# Patient Record
Sex: FEMALE | Race: WHITE | Employment: OTHER | URBAN - METROPOLITAN AREA
[De-identification: names, ages, dates, MRNs, and addresses within clinical notes are randomized per-mention and may not be internally consistent; named-entity substitution may affect disease eponyms.]

---

## 2024-09-19 LAB — AMB EXT HGBA1C: 7.8 %

## 2024-09-24 ENCOUNTER — LAB ENCOUNTER (OUTPATIENT)
Dept: LAB | Age: 69
End: 2024-09-24
Attending: FAMILY MEDICINE
Payer: MEDICARE

## 2024-09-24 ENCOUNTER — OFFICE VISIT (OUTPATIENT)
Facility: CLINIC | Age: 69
End: 2024-09-24
Payer: MEDICARE

## 2024-09-24 VITALS
DIASTOLIC BLOOD PRESSURE: 74 MMHG | BODY MASS INDEX: 40.82 KG/M2 | SYSTOLIC BLOOD PRESSURE: 120 MMHG | HEART RATE: 80 BPM | OXYGEN SATURATION: 97 % | HEIGHT: 66 IN | WEIGHT: 254 LBS

## 2024-09-24 DIAGNOSIS — E10.65 TYPE 1 DIABETES MELLITUS WITH HYPERGLYCEMIA (HCC): Primary | ICD-10-CM

## 2024-09-24 DIAGNOSIS — E10.65 TYPE 1 DIABETES MELLITUS WITH HYPERGLYCEMIA (HCC): ICD-10-CM

## 2024-09-24 DIAGNOSIS — E78.2 MIXED HYPERLIPIDEMIA: ICD-10-CM

## 2024-09-24 DIAGNOSIS — R63.5 WEIGHT GAIN: ICD-10-CM

## 2024-09-24 DIAGNOSIS — H43.13 VITREOUS HEMORRHAGE OF BOTH EYES (HCC): ICD-10-CM

## 2024-09-24 DIAGNOSIS — E03.9 HYPOTHYROIDISM, UNSPECIFIED TYPE: ICD-10-CM

## 2024-09-24 DIAGNOSIS — Z12.31 VISIT FOR SCREENING MAMMOGRAM: ICD-10-CM

## 2024-09-24 DIAGNOSIS — F32.A CHRONIC DEPRESSION: ICD-10-CM

## 2024-09-24 DIAGNOSIS — F41.9 ANXIETY: ICD-10-CM

## 2024-09-24 PROBLEM — H35.373 EPIRETINAL MEMBRANE (ERM) OF BOTH EYES: Status: ACTIVE | Noted: 2020-07-13

## 2024-09-24 PROBLEM — H25.10 NUCLEAR SENILE CATARACT: Status: ACTIVE | Noted: 2023-02-04

## 2024-09-24 LAB
ALBUMIN SERPL-MCNC: 4.2 G/DL (ref 3.2–4.8)
ALBUMIN/GLOB SERPL: 1.4 {RATIO} (ref 1–2)
ALP LIVER SERPL-CCNC: 99 U/L
ALT SERPL-CCNC: 20 U/L
ANION GAP SERPL CALC-SCNC: 6 MMOL/L (ref 0–18)
AST SERPL-CCNC: 24 U/L (ref ?–34)
BASOPHILS # BLD AUTO: 0.08 X10(3) UL (ref 0–0.2)
BASOPHILS NFR BLD AUTO: 1.1 %
BILIRUB SERPL-MCNC: 0.5 MG/DL (ref 0.2–1.1)
BUN BLD-MCNC: 20 MG/DL (ref 9–23)
BUN/CREAT SERPL: 20.6 (ref 10–20)
CALCIUM BLD-MCNC: 9.7 MG/DL (ref 8.7–10.4)
CHLORIDE SERPL-SCNC: 104 MMOL/L (ref 98–112)
CHOLEST SERPL-MCNC: 249 MG/DL (ref ?–200)
CO2 SERPL-SCNC: 28 MMOL/L (ref 21–32)
CREAT BLD-MCNC: 0.97 MG/DL
CREAT UR-SCNC: 81.5 MG/DL
DEPRECATED RDW RBC AUTO: 44 FL (ref 35.1–46.3)
EGFRCR SERPLBLD CKD-EPI 2021: 64 ML/MIN/1.73M2 (ref 60–?)
EOSINOPHIL # BLD AUTO: 0.12 X10(3) UL (ref 0–0.7)
EOSINOPHIL NFR BLD AUTO: 1.7 %
ERYTHROCYTE [DISTWIDTH] IN BLOOD BY AUTOMATED COUNT: 13.5 % (ref 11–15)
FASTING PATIENT LIPID ANSWER: NO
FASTING STATUS PATIENT QL REPORTED: NO
GLOBULIN PLAS-MCNC: 3 G/DL (ref 2–3.5)
GLUCOSE BLD-MCNC: 281 MG/DL (ref 70–99)
HCT VFR BLD AUTO: 40.8 %
HDLC SERPL-MCNC: 104 MG/DL (ref 40–59)
HGB BLD-MCNC: 13.3 G/DL
IMM GRANULOCYTES # BLD AUTO: 0.01 X10(3) UL (ref 0–1)
IMM GRANULOCYTES NFR BLD: 0.1 %
LDLC SERPL CALC-MCNC: 137 MG/DL (ref ?–100)
LYMPHOCYTES # BLD AUTO: 1.45 X10(3) UL (ref 1–4)
LYMPHOCYTES NFR BLD AUTO: 20.5 %
MCH RBC QN AUTO: 29.4 PG (ref 26–34)
MCHC RBC AUTO-ENTMCNC: 32.6 G/DL (ref 31–37)
MCV RBC AUTO: 90.1 FL
MICROALBUMIN UR-MCNC: 4.6 MG/DL
MICROALBUMIN/CREAT 24H UR-RTO: 56.4 UG/MG (ref ?–30)
MONOCYTES # BLD AUTO: 0.52 X10(3) UL (ref 0.1–1)
MONOCYTES NFR BLD AUTO: 7.4 %
NEUTROPHILS # BLD AUTO: 4.88 X10 (3) UL (ref 1.5–7.7)
NEUTROPHILS # BLD AUTO: 4.88 X10(3) UL (ref 1.5–7.7)
NEUTROPHILS NFR BLD AUTO: 69.2 %
NONHDLC SERPL-MCNC: 145 MG/DL (ref ?–130)
OSMOLALITY SERPL CALC.SUM OF ELEC: 299 MOSM/KG (ref 275–295)
PLATELET # BLD AUTO: 242 10(3)UL (ref 150–450)
POTASSIUM SERPL-SCNC: 4.4 MMOL/L (ref 3.5–5.1)
PROT SERPL-MCNC: 7.2 G/DL (ref 5.7–8.2)
RBC # BLD AUTO: 4.53 X10(6)UL
SODIUM SERPL-SCNC: 138 MMOL/L (ref 136–145)
TRIGL SERPL-MCNC: 52 MG/DL (ref 30–149)
VLDLC SERPL CALC-MCNC: 9 MG/DL (ref 0–30)
WBC # BLD AUTO: 7.1 X10(3) UL (ref 4–11)

## 2024-09-24 PROCEDURE — 85025 COMPLETE CBC W/AUTO DIFF WBC: CPT

## 2024-09-24 PROCEDURE — 82570 ASSAY OF URINE CREATININE: CPT

## 2024-09-24 PROCEDURE — 99204 OFFICE O/P NEW MOD 45 MIN: CPT | Performed by: FAMILY MEDICINE

## 2024-09-24 PROCEDURE — 36415 COLL VENOUS BLD VENIPUNCTURE: CPT

## 2024-09-24 PROCEDURE — 80053 COMPREHEN METABOLIC PANEL: CPT

## 2024-09-24 PROCEDURE — 80061 LIPID PANEL: CPT

## 2024-09-24 PROCEDURE — 82043 UR ALBUMIN QUANTITATIVE: CPT

## 2024-09-24 RX ORDER — OMEPRAZOLE 40 MG/1
40 CAPSULE, DELAYED RELEASE ORAL DAILY
COMMUNITY
Start: 2022-05-17

## 2024-09-24 RX ORDER — INSULIN ASPART 100 [IU]/ML
INJECTION, SOLUTION INTRAVENOUS; SUBCUTANEOUS
COMMUNITY

## 2024-09-24 RX ORDER — BUPROPION HYDROCHLORIDE 300 MG/1
300 TABLET ORAL EVERY MORNING
COMMUNITY
Start: 2024-08-18

## 2024-09-24 RX ORDER — SODIUM PHOSPHATE,MONO-DIBASIC 19G-7G/118
ENEMA (ML) RECTAL AS DIRECTED
COMMUNITY

## 2024-09-24 RX ORDER — ROSUVASTATIN CALCIUM 10 MG/1
10 TABLET, COATED ORAL NIGHTLY
COMMUNITY

## 2024-09-24 RX ORDER — DULOXETIN HYDROCHLORIDE 60 MG/1
60 CAPSULE, DELAYED RELEASE ORAL DAILY
COMMUNITY

## 2024-09-24 RX ORDER — DULOXETIN HYDROCHLORIDE 30 MG/1
30 CAPSULE, DELAYED RELEASE ORAL DAILY
COMMUNITY
Start: 2024-07-08

## 2024-09-24 RX ORDER — LISINOPRIL 10 MG/1
20 TABLET ORAL DAILY
COMMUNITY

## 2024-09-24 RX ORDER — LEVOTHYROXINE SODIUM 125 UG/1
125 TABLET ORAL DAILY
COMMUNITY
Start: 2024-09-01

## 2024-09-24 NOTE — PROGRESS NOTES
CC:    Chief Complaint   Patient presents with    Establish Care       HPI: 69 year old female here to establish care.  Follows with an endocrinologist for her Type 1 diabetes and hypothyroidism as well.   She has an insulin pump and continuous glucose monitor.   Sees her ophthalmologist every 6 months due to diabetic retinopathy.   Also has a psychiatrist for anxiety and depression, and just started seeing him.   Has been on Cymbalta and Bupropion for many years, and he increased her Cymbalta to 90 mg daily.   Interested in possibly seeing a new psychiatrist, and particularly one who is a female.   Was recently started on Crestor, but has not picked it up yet.   Has not had a mammogram for a long time, but she did have breast cancer at age 52.   She was treated with chemotherapy, radiation, Tamoxifen, and right breast partial mastectomy.   Has gained 32 pounds in the past year, and not sure what has caused it.   She used to walk a lot more, but not as much since gaining the weight.     ROS:  General:  No fever, no fatigue, weight gain   HEENT:  Denies congestion or nasal discharge, occasional blurry vision   Cardio:  No chest pain  Pulmonary:  No cough, no SOB  GI:  No N/V/D  Dermatologic:  No rashes  Psych: chronic depression and anxiety   Neuro: no headaches     Past Medical History:    Breast cancer (HCC)    Essential hypertension    Hypothyroidism    Type 1 diabetes mellitus (HCC)       Social History     Socioeconomic History    Marital status:      Spouse name: Not on file    Number of children: Not on file    Years of education: Not on file    Highest education level: Not on file   Occupational History    Not on file   Tobacco Use    Smoking status: Never    Smokeless tobacco: Never   Vaping Use    Vaping status: Never Used   Substance and Sexual Activity    Alcohol use: No    Drug use: Never    Sexual activity: Not on file   Other Topics Concern    Not on file   Social History Narrative    Not on  file     Social Determinants of Health     Financial Resource Strain: Low Risk  (3/6/2024)    Received from [facility]    Overall Financial Resource Strain (CARDIA)     Difficulty of Paying Living Expenses: Not very hard   Food Insecurity: No Food Insecurity (3/6/2024)    Received from [facility]    Hunger Vital Sign     Worried About Running Out of Food in the Last Year: Never true     Ran Out of Food in the Last Year: Never true   Transportation Needs: No Transportation Needs (3/6/2024)    Received from [facility]    PRAPARE - Transportation     Lack of Transportation (Medical): No     Lack of Transportation (Non-Medical): No   Physical Activity: Not on file   Stress: Not on file   Social Connections: Not on file   Housing Stability: Unknown (3/6/2024)    Received from [facility]    Housing Stability Vital Sign     Unable to Pay for Housing in the Last Year: No     Number of Places Lived in the Last Year: Not on file     Unstable Housing in the Last Year: No       Current Outpatient Medications   Medication Sig Dispense Refill    NOVOLOG 100 UNIT/ML Injection Solution ADMINISTER 70 UNITS UNDER THE SKIN DAILY. INSULIN PUMP      buPROPion  MG Oral Tablet 24 Hr Take 1 tablet (300 mg total) by mouth every morning.      Cholecalciferol ( VITAMIN D3) 25 MCG (1000 UT) Oral Tab Take by mouth As Directed.      DULoxetine 30 MG Oral Cap DR Particles Take 1 capsule (30 mg total) by mouth daily.      DULoxetine 60 MG Oral Cap DR Particles Take 1 capsule (60 mg total) by mouth daily.      Glucose Blood (CONTOUR NEXT TEST) In Vitro Strip To Calibrate Dexcom G6 once daily and also incase of Dexcom Failure. Pt on insulin pump and type 1 diabetes ICD 10 Code E10.65      lisinopril 10 MG Oral Tab Take 2 tablets (20 mg total) by mouth daily.      Omeprazole 40 MG Oral Capsule Delayed Release Take 1 capsule (40 mg total) by mouth daily.       levothyroxine 125 MCG Oral Tab Take 1 tablet (125 mcg total) by mouth daily.      rosuvastatin 10 MG Oral Tab Take 1 tablet (10 mg total) by mouth nightly.      INSULIN ADMIN SUPPLIES None Entered         Patient has no known allergies.      Vitals:   Vitals:    09/24/24 1234 09/24/24 1314   BP: 140/86 120/74   Pulse: 80    SpO2: 97%    Weight: 254 lb (115.2 kg)    Height: 5' 6\" (1.676 m)        Body mass index is 41 kg/m².    Physical:  General:  Alert, appropriate, no acute distress   HEENT: supple, no lymphadenopathy   Cardio:  RRR, no murmurs, S1, S2  Pulmonary:  Clear bilaterally, good air entry  Dermatologic:  No rashes or lesions    Assessment and Plan: 69-year-old female with multiple chronic medical conditions here to establish care.    1. Type 1 diabetes mellitus with hyperglycemia (HCC)    -Followed by endocrinology, and will check routine labs and urine screening as she is due today  - CBC With Differential With Platelet; Future  - Comp Metabolic Panel (14); Future  - Microalb/Creat Ratio, Random Urine [E]; Future  - [facility] - Dr. Dusty Frey [facility]    2. Hypothyroidism, unspecified type    -Stable on current levothyroxine dose, and following with endocrinology    3. Mixed hyperlipidemia    -Recently agreed to start a statin, and will check lipid panel  - Lipid Panel; Future    4. Vitreous hemorrhage of both eyes (HCC)    -Stable and followed by ophthalmology regularly    5. Anxiety    -Requesting new psychiatrist for management of anxiety and depression, and referral to [facility]ie Saxena navigator given  - Mannie Holt    6. Chronic depression    -Referral to Mannie holt given for new psychiatrist  - Mannie Holt    7. Weight gain    -Referral to weight management clinic given to discuss medication options given limitations with Medicare insurance  - [facility] - Dr. Dusty Frey [facility]    8.  Visit for screening mammogram    - Due for screening mammogram   - [facility] GERARD 2D+3D SCREENING BILAT (CPT=[id number]); Carla More DO  09/24/24  12:45 PM

## 2024-09-30 ENCOUNTER — TELEPHONE (OUTPATIENT)
Age: 69
End: 2024-09-30

## 2024-09-30 NOTE — TELEPHONE ENCOUNTER
Hello,  Sorry I missed you - I am reaching out from the [facility] Navigation department, following up on an order from your provider's office to assist in connecting you with resources for care. If you would like to discuss this further, please give us a call back at [phone], or for more immediate assistance you can contact our 24-hour help line at [phone]. We look forward to hearing from you soon.

## 2024-10-13 ENCOUNTER — TELEPHONE (OUTPATIENT)
Age: 69
End: 2024-10-13

## 2024-10-13 NOTE — TELEPHONE ENCOUNTER
Hello,     The [facility] Navigation team has attempted to reach you regarding an order from Dr. More's office. We are reaching out in order to assist you in coordinating care and resources that may meet your needs. Please give our office a call at [phone]. For more immediate assistance you can contact our 24-hour help line at [phone]. We look forward to hearing from you soon.

## 2024-10-15 ENCOUNTER — TELEPHONE (OUTPATIENT)
Age: 69
End: 2024-10-15

## 2024-10-15 NOTE — TELEPHONE ENCOUNTER
Luzma Contreras,    I'm glad that we were able to connect today. Here are some psychiatry resources that may be a good fit. Please verify your insurance (Medicare) coverage with any providers that you may choose to call and schedule with directly. If distance is a concern for any of these providers, I'd recommend inquiring about virtual/telehealth services.     If you need further assistance, please give our office a call at [phone]. If you need more immediate assistance, or assistance outside of business hours, please contact the [facility] 24/7 helpline at [phone].    Holli Beckford PA-C  [facility]  [address] [place], [place], [address]  Phone: [phone]    DARCY Grullon  [facility]   [address], [address], [place], [address]   Phone: [phone]    Roselia Garcia [facility]  [address], [address], [place], [address]   Phone: [phone]    Misti Mckeon [facility]  [address], [address], [place] [address]  Phone: [phone]  [email or web address]    Eliana PIEDRA LCSW (she/her/hers)  Patient Care Navigator - Mental Health  [facility]    [email or web address]  Request an assessment or support »

## 2024-12-17 ENCOUNTER — OFFICE VISIT (OUTPATIENT)
Age: 69
End: 2024-12-17
Payer: MEDICARE

## 2024-12-17 VITALS
DIASTOLIC BLOOD PRESSURE: 82 MMHG | OXYGEN SATURATION: 98 % | SYSTOLIC BLOOD PRESSURE: 148 MMHG | BODY MASS INDEX: 42.63 KG/M2 | HEIGHT: 65.4 IN | WEIGHT: 259 LBS | HEART RATE: 80 BPM

## 2024-12-17 DIAGNOSIS — E66.01 OBESITY, CLASS III, BMI 40-49.9 (MORBID OBESITY) (HCC): ICD-10-CM

## 2024-12-17 DIAGNOSIS — E10.65 TYPE 1 DIABETES MELLITUS WITH HYPERGLYCEMIA (HCC): Primary | ICD-10-CM

## 2024-12-17 DIAGNOSIS — R63.2 BINGE EATING: ICD-10-CM

## 2024-12-17 DIAGNOSIS — E78.2 MIXED HYPERLIPIDEMIA: ICD-10-CM

## 2024-12-17 DIAGNOSIS — E03.9 HYPOTHYROIDISM, UNSPECIFIED TYPE: ICD-10-CM

## 2024-12-17 DIAGNOSIS — F32.A CHRONIC DEPRESSION: ICD-10-CM

## 2024-12-17 DIAGNOSIS — Z85.3 HX: BREAST CANCER: ICD-10-CM

## 2024-12-17 PROCEDURE — 99205 OFFICE O/P NEW HI 60 MIN: CPT | Performed by: INTERNAL MEDICINE

## 2024-12-17 NOTE — PROGRESS NOTES
CC:   Chief Complaint   Patient presents with    Consult    Weight Management        Referring Physician to whom this note will be reported back to: Mohini More DO   Reason for medical consultation: Medical Management of Weight and Weight related comorbidities.      HPI:   - obesity as childhood, went to a doctor, she started eating schedule and was able to lose weight and kept it off  -maintained 130 lbs until pregnancy  -gained 20 lbs with pregnancy  -WW occasionally lost about 10 lbs  -recently gained over 30 lbs since 8/2023  -type 1 DM, average A1c 7.8    Body mass index is 42.57 kg/m².   Wt Readings from Last 6 Encounters:   12/17/24 259 lb (117.5 kg)   09/24/24 254 lb (115.2 kg)     /82   Pulse 80   Ht 5' 5.4\" (1.661 m)   Wt 259 lb (117.5 kg)   SpO2 98%   BMI 42.57 kg/m²   Vitals:    12/17/24 1003   BP: 148/82   Pulse: 80     Body mass index is 42.57 kg/m².  Waist Circumference: 52 inches     Soda Drinker?: No      LABS and RESULTS:     [facility] Lab Encounter on 09/24/2024   Component Date Value    WBC 09/24/2024 7.1     RBC 09/24/2024 4.53     HGB 09/24/2024 13.3     HCT 09/24/2024 40.8     MCV 09/24/2024 90.1     MCH 09/24/2024 29.4     MCHC 09/24/2024 32.6     RDW-SD 09/24/2024 44.0     RDW 09/24/2024 13.5     PLT 09/24/2024 242.0     Neutrophil Absolute Prel* 09/24/2024 4.88     Neutrophil Absolute 09/24/2024 4.88     Lymphocyte Absolute 09/24/2024 1.45     Monocyte Absolute 09/24/2024 0.52     Eosinophil Absolute 09/24/2024 0.12     Basophil Absolute 09/24/2024 0.08     Immature Granulocyte Abs* 09/24/2024 0.01     Neutrophil % 09/24/2024 69.2     Lymphocyte % 09/24/2024 20.5     Monocyte % 09/24/2024 7.4     Eosinophil % 09/24/2024 1.7     Basophil % 09/24/2024 1.1     Immature Granulocyte % 09/24/2024 0.1     Glucose 09/24/2024 281 (H)     Sodium 09/24/2024 138     Potassium 09/24/2024 4.4     Chloride 09/24/2024 104     CO2 09/24/2024 28.0     Anion Gap 09/24/2024 6     BUN 09/24/2024 20      Creatinine 09/24/2024 0.97     BUN/CREA Ratio 09/24/2024 20.6 (H)     Calcium, Total 09/24/2024 9.7     Calculated Osmolality 09/24/2024 299 (H)     eGFR-Cr 09/24/2024 64     ALT 09/24/2024 20     AST 09/24/2024 24     Alkaline Phosphatase 09/24/2024 99     Bilirubin, Total 09/24/2024 0.5     Total Protein 09/24/2024 7.2     Albumin 09/24/2024 4.2     Globulin  09/24/2024 3.0     A/G Ratio 09/24/2024 1.4     Patient Fasting for CMP? 09/24/2024 No     Cholesterol, Total 09/24/2024 249 (H)     HDL Cholesterol 09/24/2024 104 (H)     Triglycerides 09/24/2024 52     LDL Cholesterol 09/24/2024 137 (H)     VLDL 09/24/2024 9     Non HDL Chol 09/24/2024 145 (H)     Patient Fasting for Lipi* 09/24/2024 No     Microalbumin, Urine 09/24/2024 4.60     Creatinine Ur Random 09/24/2024 81.50     Malb/Cre Calc 09/24/2024 56.4 (H)    Office Visit on 09/24/2024   Component Date Value    HgbA1C 09/19/2024 7.8        Current Outpatient Medications   Medication Sig Dispense Refill    semaglutide-weight management 0.5 MG/0.5ML Subcutaneous Solution Auto-injector Inject 0.5 mL (0.5 mg total) into the skin once a week for 4 doses. 2 mL 0    NOVOLOG 100 UNIT/ML Injection Solution ADMINISTER 70 UNITS UNDER THE SKIN DAILY. INSULIN PUMP      buPROPion  MG Oral Tablet 24 Hr Take 1 tablet (300 mg total) by mouth every morning.      Cholecalciferol ( VITAMIN D3) 25 MCG (1000 UT) Oral Tab Take by mouth As Directed.      DULoxetine 30 MG Oral Cap DR Particles Take 1 capsule (30 mg total) by mouth daily.      DULoxetine 60 MG Oral Cap DR Particles Take 1 capsule (60 mg total) by mouth daily.      Glucose Blood (CONTOUR NEXT TEST) In Vitro Strip To Calibrate Dexcom G6 once daily and also incase of Dexcom Failure. Pt on insulin pump and type 1 diabetes ICD 10 Code E10.65      lisinopril 10 MG Oral Tab Take 2 tablets (20 mg total) by mouth daily.      levothyroxine 125 MCG Oral Tab Take 1 tablet (125 mcg total) by mouth daily.      INSULIN ADMIN  SUPPLIES None Entered        Past Medical History:    Breast cancer (HCC)    Essential hypertension    Hypothyroidism    Type 1 diabetes mellitus (HCC)       Past Surgical History:   Procedure Laterality Date    Mastectomy partial Right 2008    Due to breast cancer       Social History:  Social History     Socioeconomic History    Marital status:    Tobacco Use    Smoking status: Never    Smokeless tobacco: Never   Vaping Use    Vaping status: Never Used   Substance and Sexual Activity    Alcohol use: No    Drug use: Never     Social Drivers of Health     Financial Resource Strain: Low Risk  (3/6/2024)    Received from [facility]    Overall Financial Resource Strain (CARDIA)     Difficulty of Paying Living Expenses: Not very hard   Food Insecurity: No Food Insecurity (3/6/2024)    Received from [facility]    Hunger Vital Sign     Worried About Running Out of Food in the Last Year: Never true     Ran Out of Food in the Last Year: Never true   Transportation Needs: No Transportation Needs (3/6/2024)    Received from [facility]    PRAPARE - Transportation     Lack of Transportation (Medical): No     Lack of Transportation (Non-Medical): No   Housing Stability: Unknown (3/6/2024)    Received from [facility]    Housing Stability Vital Sign     Unable to Pay for Housing in the Last Year: No     Unstable Housing in the Last Year: No     Family History:  Family History   Problem Relation Age of Onset    Cancer Mother         colon cancer    Diabetes Brother           REVIEW OF SYSTEMS:   10 point review of systems otherwise negative.  With the exception of HPI and assessment and plan        EXAM:     GENERAL: NAD, conversant  SKIN: good turgor, no jaundice  HEENT: nl external nose and ears  NECK: supple  LUNGS: nl effort, clear to auscultation bilaterally  CARDIO: RRR, no murmur  ABDOMEN: NT/ND, no masses  EXTREMITIES: gait  normal, no edema   PSYCH: Oriented times three, appropriate affect       ASSESSMENT AND PLAN:     1. Type 1 diabetes mellitus with hyperglycemia (HCC)  2. Hypothyroidism, unspecified type  3. Chronic depression  4. Mixed hyperlipidemia  5. HX: breast cancer  6. Binge eating  7. Obesity, Class III, BMI 40-49.9 (morbid obesity) (HCC)    - Initial weight 259 lb (117.5 kg), Initial Body mass index is 42.57 kg/m².  - The patient participated in a comprehensive weight management program that encourages behavioral modification, reduced calorie diet and increased physical activity with continuing follow up for at least 6 months prior to using drug therapy.     Plan:  - semaglutide-weight management 0.5 MG/0.5ML Subcutaneous Solution Auto-injector; Inject 0.5 mL (0.5 mg total) into the skin once a week for 4 doses.  Dispense: 2 mL; Refill: 0  - DIETITIAN EDUCATION INITIAL, DIET (INTERNAL)  - if not covered, ozempic pen  Regarding Obesity:  Follow up with dietitian and psychologist as recommended.  Discussed the role of sleep and stress in weight management.  Labs orders as above.  Counseled on comprehensive weight loss plan including attention to nutrition, exercise and behavior/stress management for success. See patient instruction below for more details.  Weight Loss Consent to treat reviewed and signed.    Regarding weight loss Medications.  Medication use and side effects reviewed with patient.    Medication will be used with a reduced calorie diet and increased physical activity in the management of exogenous obesity.  Patient will be responsible to let me know of any side effects or complications with medications.    Return in about 4 weeks (around 1/14/2025).     Jaimee Gray MD

## 2024-12-17 NOTE — PATIENT INSTRUCTIONS
Please try to work on the following dietary changes:  Goals: Aim for 20-30 grams of protein/ meal  Aim for <100 grams of carbohydrates/day  Eat 4-6 vegetables/day  Avoid skipping meals- eat every 4-5 hours  Aim for 3 meals/day  2. Drink lots of water and cut down on soda/juice consumption if soda/juice drinker  3. Focus on protein: (15-30 grams with each meal) ie. greek yogurt, cottage cheese, string cheese, hard boiled eggs  4. Healthy snacks: always have protein in your snack! peanut butter and apples, hummus and carrots, berries, nuts (1/4 cup), tuna and crackers                 Protein Shakes: Premier protein or Core Power                Protein Bars: Rx Bars, Oatmega, Power Crunch                 Sargento balanced breaks (cheese and nuts)- without chocolate  5. Reduce carbohydrates <100 grams which includes sweets as well as rice, pasta, potatoes, bread, corn and instead choose whole grain options or more protein or vegetables (4-6 servings of vegetables per day). Use Epoq sid for carb counting!  6. Get a good night of sleep  7. Try to decrease stress in life; meditate at least 10 minutes before sleeping! Try it and let me know what works for you, try youRoobiqube or apps like Calm and Respectance!     Please download apps:  1. \"My Fitness Pal\" (other option is Lose it)) to help you to monitor daily dietary intake and you will be able to see if you are eating the right amount of calories, protein, carbs                With My Fitness Pal-->When you set-up the sid or need to adjust settings:                Goals should include:                 Lose 1.5-2 lbs per week                Activity level: not very active (can't count exercise towards calorie number per day)                   ** Daily INPUT> Look at nutrition section-- \"nutrients\" and it will break down your macros for the day (ie. Protein, carbs, fibers, sugars and fats). Try to stay within these numbers daily     2. \"7 minute workout\" to help with  exercise/activity which takes 7 minutes of your day and that you can do at home!   3. \"Calm\" or \"Headspace\" which helps with mindfulness, meditation, clarity, sleep, and marek to your daily life.   4. Skinnytaste blog for healthy recipe ideas  5. DietMobim for low carb resources     HIGH PROTEIN SNACK IDEAS  -cottage cheese  -plain yogurt  -kefir  -hard-boiled eggs  -natural cheeses  -nuts (measure portion size)   -unsweetened nut butters  -dried edamame   -preet seeds soaked in water or almond milk  -soy nuts  -cured meats (monitor for sodium issues)   -hummus with vegetables  -bean dip with vegetables     FRUIT  Low carb fruit options   Raspberries: Half a cup (60 grams) contains 3 grams of carbs.  Blackberries: Half a cup (70 grams) contains 4 grams of carbs.  Strawberries: Half a cup (100 grams) contains 6 grams of carbs.  Blueberries: Half a cup (50 grams) contains 6 grams of carbs.  Plum: One medium-sized (80 grams) contains 6 grams of carbs.     VEGETABLES  Low carb vegetables             Understanding Carbohydrates  Goal <100g  A car needs the right type of fuel to run. And you need the right kind of food to function. To keep your energy level up, your body needs food that has carbohydrates (carbs). But carbs raise blood sugar levels higher and faster than other kinds of food. Your dietitian will work with you to figure out the amount of carbs youneed. Carbs come in 3 types: starches, sugars, and fiber.   Starches  Starches are found in grains, some vegetables, and beans. Grain products include bread, pasta, cereal, and tortillas. Starchy vegetables include potatoes, peas, corn, lima beans, yams, and squash. Kidney beans, elmore beans,black beans, garbanzo beans, and lentils also have starches.     Sugars  Sugars are found naturally in many foods. Or they can be added. Foods that contain natural sugar include fruits and fruit juices, dairy products, honey, and molasses. Added sugars are found in most  desserts, processed foods, candy, regular soda, and fruit drinks. These are very helpful to treat low blood sugar (hypoglycemia). They give you sugar quickly. Try to keep at least 15 to 20 grams of these simple sugars with you at all times. Eat or drink these if you start to havesymptoms of low blood sugar.   Fiber  Fiber comes from plant foods. Your body can't digest most fiber. Instead of raising blood sugar levels like other carbs, fiber stops blood sugar from rising too fast. Fiber is found in fruits, vegetables, whole grains, beans,peas, and many nuts.   Carb counting  Keep track of the amount of carbs you eat. This can help you keep the right balance of carbs, physical activity, and medicine. The amount of carbs you need will be different from what other people need. How much you need depends on many things. These include your health, the medicines you take, and how active you are. Your healthcare team will help you figure out the right amount of carbs for you. You may start with 45 to 60 grams of carbs per meal, depending on your case. Carb counting is a system that helps you keep track of thecarbohydrates you eat at each meal.   Carbs come from many foods. These include grains, starchy vegetables, fruit, milk, beans, and snack foods. You can either count carbohydrate grams or carbohydrate servings. When you count carbohydrate servings, 1 carbohydrateserving = 15 grams of carbohydrates.   Here are some examples of foods that have about 15 grams of carbs (1 serving of carbohydrates):   1/2 cup of canned or frozen fruit  A small piece of fresh fruit (4 ounces)  1 slice of bread  1/2 cup of oatmeal  1/3 cup of rice  4 to 6 crackers  1/2 English muffin  1/2 cup of black beans  1/4 of a large baked potato (3 ounces)  2/3 cup of plain fat-free yogurt  1 cup of soup  1/2 cup of casserole  6 chicken nuggets  2-inch-square brownie or cake without frosting  2 small cookies  1/2 cup of ice cream or sherbet  Carb  counting is easier when food labels are available. Look at the label to see how many grams of total carbs per serving the food contains. Then you can figure out how much you should eat. If your food doesn't have a nutrition label, you should be able to get an idea how many carbs there are per servingby using a book or website.   Two very important lines to look at on the label are the serving size and the total carbohydrate amount per serving. Here are some tips for using food labelsto count your carbs:   Check the serving size. The information on the label is based on that serving size. If you eat more than the listed serving size, you may have to double or triple the other information on the label.   Check the total grams of carbs.  Total carbohydrate from the label includes sugar, starch, and fiber. Be sure to use the total carbohydrate number (minus the fiber) and not sugar alone.  Know how many grams of carbs you can have.  Be familiar with the matching portion sizes.  Compare labels. Compare the labels of different products. Look at serving sizes and total carbs to find the products that work best for you.   Don't forget protein and fat. With the focus on carb counting, it might be easy to forget protein and fat in your meals. Don't forget to include sources of protein and healthy fat to balance your meals. Also watch how much salt (sodium) you eat. This is especially true if you have high blood pressure. If you have diabetes, limit the amount of sodium to less than 2,300 mg a day.    It’s also important to be consistent with the amount of carbs and time you eat when taking a fixed dose of diabetes medicine. Work with your healthcare provider or dietitian if you need more help. They can help you keep track of your carbs. They can also help you figure out how many grams of carbs youshould have.

## 2025-01-06 ENCOUNTER — PATIENT MESSAGE (OUTPATIENT)
Facility: CLINIC | Age: 70
End: 2025-01-06

## 2025-01-14 ENCOUNTER — TELEPHONE (OUTPATIENT)
Dept: INTERNAL MEDICINE CLINIC | Facility: CLINIC | Age: 70
End: 2025-01-14

## 2025-01-14 NOTE — TELEPHONE ENCOUNTER
Will fax your prescription to [facility]. You should receive the medication within 3-7 business days.  You can call them at [phone].

## 2025-01-15 NOTE — TELEPHONE ENCOUNTER
Patient is under the impression was going to receive Wegovy through [facility] not a compound medication. Patient states does not feel comfortable on starting compound having diagnosis of type 1 diabetes. Patient is requesting advise if compound is safe with her diagnosis. Patient would also like clarification on what she was told about getting wegovy for the price of $400.     Please advise. Patient would like a call back.

## 2025-01-16 ENCOUNTER — HOSPITAL ENCOUNTER (OUTPATIENT)
Dept: MAMMOGRAPHY | Age: 70
Discharge: HOME | End: 2025-01-16
Attending: FAMILY MEDICINE
Payer: MEDICARE

## 2025-01-16 DIAGNOSIS — Z12.31 VISIT FOR SCREENING MAMMOGRAM: ICD-10-CM

## 2025-01-16 PROCEDURE — 77067 SCR MAMMO BI INCL CAD: CPT | Performed by: FAMILY MEDICINE

## 2025-01-16 PROCEDURE — 77063 BREAST TOMOSYNTHESIS BI: CPT | Performed by: FAMILY MEDICINE

## 2025-01-31 ENCOUNTER — TELEPHONE (OUTPATIENT)
Facility: CLINIC | Age: 70
End: 2025-01-31

## 2025-03-06 ENCOUNTER — TELEMEDICINE (OUTPATIENT)
Dept: SURGERY | Facility: CLINIC | Age: 70
End: 2025-03-06
Payer: MEDICARE

## 2025-03-06 DIAGNOSIS — E03.9 HYPOTHYROIDISM, UNSPECIFIED TYPE: ICD-10-CM

## 2025-03-06 DIAGNOSIS — R63.2 BINGE EATING: ICD-10-CM

## 2025-03-06 DIAGNOSIS — E66.01 OBESITY, CLASS III, BMI 40-49.9 (MORBID OBESITY) (HCC): ICD-10-CM

## 2025-03-06 DIAGNOSIS — E10.65 TYPE 1 DIABETES MELLITUS WITH HYPERGLYCEMIA (HCC): Primary | ICD-10-CM

## 2025-03-06 DIAGNOSIS — E78.2 MIXED HYPERLIPIDEMIA: ICD-10-CM

## 2025-03-06 DIAGNOSIS — Z85.3 HX: BREAST CANCER: ICD-10-CM

## 2025-03-06 DIAGNOSIS — F32.A CHRONIC DEPRESSION: ICD-10-CM

## 2025-03-06 PROCEDURE — 99214 OFFICE O/P EST MOD 30 MIN: CPT | Performed by: INTERNAL MEDICINE

## 2025-03-06 NOTE — PROGRESS NOTES
Patient ID: Diane Rosales is a 69 year old female.  No chief complaint on file.         HISTORY OF PRESENT ILLNESS:   Patient presents for above.  This visit is conducted using Telemedicine with live, interactive video and audio.    Been having some constipation with GLP1, instructed to increase water, fiber, supplements    Initial weight: 259 lbs 12/2024  Current weight: 254 lbs  Interval weight loss: 5 lbs  Total weight loss: 5 lbs    Review of Systems   Gastrointestinal:  Positive for constipation.   All other systems reviewed and are negative.     MEDICAL HISTORY:     Past Medical History:    Breast cancer (HCC)    Partial mastectomy    Essential hypertension    Hypothyroidism    Type 1 diabetes mellitus (HCC)       Past Surgical History:   Procedure Laterality Date    Chemotherapy      Lumpectomy right      Mastectomy partial Right 2008    Due to breast cancer    Radiation right           Current Outpatient Medications:     semaglutide 4 MG/3ML Subcutaneous Solution Pen-injector, Start with 0.25 mg once weekly, Disp: 3 mL, Rfl: 1    CUSTOM MEDICATION, Semaglutide/cyanocobalamin injection   Concentration: 5 mg/ 0.5 mL  Amount: 2.5 ML vial with supplies Instructions: Inject 10 units weekly subcutaneous, Disp: 1 each, Rfl: 0    NOVOLOG 100 UNIT/ML Injection Solution, ADMINISTER 70 UNITS UNDER THE SKIN DAILY. INSULIN PUMP, Disp: , Rfl:     buPROPion  MG Oral Tablet 24 Hr, Take 1 tablet (300 mg total) by mouth every morning., Disp: , Rfl:     Cholecalciferol ( VITAMIN D3) 25 MCG (1000 UT) Oral Tab, Take by mouth As Directed., Disp: , Rfl:     DULoxetine 30 MG Oral Cap DR Particles, Take 1 capsule (30 mg total) by mouth daily., Disp: , Rfl:     DULoxetine 60 MG Oral Cap DR Particles, Take 1 capsule (60 mg total) by mouth daily., Disp: , Rfl:     Glucose Blood (CONTOUR NEXT TEST) In Vitro Strip, To Calibrate Dexcom G6 once daily and also incase of Dexcom Failure. Pt on insulin pump and type 1 diabetes ICD  10 Code E10.65, Disp: , Rfl:     lisinopril 10 MG Oral Tab, Take 2 tablets (20 mg total) by mouth daily., Disp: , Rfl:     levothyroxine 125 MCG Oral Tab, Take 1 tablet (125 mcg total) by mouth daily., Disp: , Rfl:     INSULIN ADMIN SUPPLIES, None Entered, Disp: , Rfl:     Allergies:Allergies[1]      Social History     Socioeconomic History    Marital status:      Spouse name: Not on file    Number of children: Not on file    Years of education: Not on file    Highest education level: Not on file   Occupational History    Not on file   Tobacco Use    Smoking status: Never    Smokeless tobacco: Never   Vaping Use    Vaping status: Never Used   Substance and Sexual Activity    Alcohol use: No    Drug use: Never    Sexual activity: Not on file   Other Topics Concern    Not on file   Social History Narrative    Not on file     Social Drivers of Health     Food Insecurity: No Food Insecurity (3/6/2024)    Received from [facility]    Hunger Vital Sign     Worried About Running Out of Food in the Last Year: Never true     Ran Out of Food in the Last Year: Never true   Transportation Needs: No Transportation Needs (3/6/2024)    Received from [facility]    PRAPARE - Transportation     Lack of Transportation (Medical): No     Lack of Transportation (Non-Medical): No   Stress: Not on file   Housing Stability: Unknown (3/6/2024)    Received from [facility]    Housing Stability Vital Sign     Unable to Pay for Housing in the Last Year: No     Number of Places Lived in the Last Year: Not on file     Unstable Housing in the Last Year: No       PHYSICAL EXAM:   Unable to perform vitals or do physical exam as this is a virtual video visit.  Patient appears alert.  No conversational dyspnea or distress.    ASSESSMENT/PLAN:   . Type 1 diabetes mellitus with hyperglycemia (HCC)  2. Hypothyroidism, unspecified type  3. Chronic depression  4. Mixed  hyperlipidemia  5. HX: breast cancer  6. Binge eating  7. Obesity, Class III, BMI 40-49.9 (morbid obesity) (HCC)    - Initial weight 259 lb (117.5 kg), Initial Body mass index is 42.57 kg/m².  - obesity as childhood, went to a doctor, she started eating schedule and was able to lose weight and kept it off  -maintained 130 lbs until pregnancy  -gained 20 lbs with pregnancy  -WW occasionally lost about 10 lbs  -recently gained over 30 lbs since 8/2023  -type 1 DM, average A1c  8.0 -> 7.7      Plan:  - using ozempic (counting clicks), was on 0.25 mg, will increase to 0.5 mg, doing well no hypoglycemia, communicating with her endocrinologist, on board  - DIETITIAN EDUCATION INITIAL, DIET (INTERNAL)  - if not covered, ozempic pen    No follow-ups on file.    Time spent on encounter  30 minutes   Video time 10 minutes   Documentation time 20 minutes     Diane Rosales understands video evaluation is not a substitute for face-to-face examination or emergency care. Patient advised to go to ER or call 911 for worsening symptoms or acute distress.     Telehealth outside of [facility]  Telehealth Verbal Consent   I conducted a telehealth visit with Diane Rosales today, 03/06/25, which was completed using two-way, real-time interactive audio and video communication. This has been done in good yelitza to provide continuity of care in the best interest of the provider-patient relationship, due to the COVID - public health crisis/national emergency where restrictions of face-to-face office visits are ongoing. Every conscious effort was taken to allow for sufficient and adequate time to complete the visit.  The patient was made aware of the limitations of the telehealth visit, including treatment limitations as no physical exam could be performed.  The patient was advised to call 911 or to go to the ER in case there was an emergency.  The patient was also advised of the potential privacy & security concerns related to the  telehealth platform.   The patient was made aware of where to find [facility]'s notice of privacy practices, telehealth consent form and other related consent forms and documents.  which are located on the [facility] website. The patient verbally agreed to telehealth consent form, related consents and the risks discussed.    Lastly, the patient confirmed that they were in [place].   Included in this visit, time may have been spent reviewing labs, medications, radiology tests and decision making. Appropriate medical decision-making and tests are ordered as detailed in the plan of care above.  Coding/billing information is submitted for this visit based on complexity of care and/or time spent for the visit.    This note was prepared using Dragon Medical voice recognition dictation software. As a result errors may occur. When identified these errors have been corrected. While every attempt is made to correct errors during dictation discrepancies may still exist.    Jaimee Gray MD  3/6/2025       [1] No Known Allergies

## 2025-04-23 ENCOUNTER — TELEPHONE (OUTPATIENT)
Facility: CLINIC | Age: 70
End: 2025-04-23

## 2025-06-05 ENCOUNTER — TELEPHONE (OUTPATIENT)
Dept: SURGERY | Facility: CLINIC | Age: 70
End: 2025-06-05

## 2025-06-05 NOTE — TELEPHONE ENCOUNTER
Pt called requesting refill for Ozempic, nk   General Sunscreen Counseling: I recommended a broad spectrum sunscreen with a SPF of 30 or higher and/or sun protective clothing to minimize sun exposure. Detail Level: Detailed

## 2025-06-09 RX ORDER — SEMAGLUTIDE 1.34 MG/ML
INJECTION, SOLUTION SUBCUTANEOUS
Qty: 3 ML | Refills: 0 | Status: SHIPPED | OUTPATIENT
Start: 2025-06-09

## 2025-07-15 ENCOUNTER — OFFICE VISIT (OUTPATIENT)
Dept: SURGERY | Facility: CLINIC | Age: 70
End: 2025-07-15
Payer: MEDICARE

## 2025-07-15 VITALS
DIASTOLIC BLOOD PRESSURE: 61 MMHG | HEART RATE: 73 BPM | HEIGHT: 65.4 IN | SYSTOLIC BLOOD PRESSURE: 99 MMHG | BODY MASS INDEX: 37.2 KG/M2 | WEIGHT: 226 LBS

## 2025-07-15 DIAGNOSIS — Z85.3 HX: BREAST CANCER: ICD-10-CM

## 2025-07-15 DIAGNOSIS — E78.2 MIXED HYPERLIPIDEMIA: ICD-10-CM

## 2025-07-15 DIAGNOSIS — E10.65 TYPE 1 DIABETES MELLITUS WITH HYPERGLYCEMIA (HCC): Primary | ICD-10-CM

## 2025-07-15 DIAGNOSIS — R63.2 BINGE EATING: ICD-10-CM

## 2025-07-15 DIAGNOSIS — E03.9 HYPOTHYROIDISM, UNSPECIFIED TYPE: ICD-10-CM

## 2025-07-15 DIAGNOSIS — E66.813 OBESITY, CLASS III, BMI 40-49.9 (MORBID OBESITY): ICD-10-CM

## 2025-07-15 DIAGNOSIS — F32.A CHRONIC DEPRESSION: ICD-10-CM

## 2025-07-15 PROCEDURE — 99214 OFFICE O/P EST MOD 30 MIN: CPT | Performed by: INTERNAL MEDICINE

## 2025-07-15 RX ORDER — SEMAGLUTIDE 1.34 MG/ML
1 INJECTION, SOLUTION SUBCUTANEOUS WEEKLY
Qty: 3 ML | Refills: 2 | Status: SHIPPED | OUTPATIENT
Start: 2025-07-15

## 2025-07-15 RX ORDER — ASPIRIN 81 MG/1
81 TABLET ORAL DAILY
COMMUNITY

## 2025-07-15 NOTE — PROGRESS NOTES
CC:   Chief Complaint   Patient presents with    Follow - Up    Weight Management        Referring Physician to whom this note will be reported back to: Mohini More DO   Reason for medical consultation: Medical Management of Weight and Weight related comorbidities.      HPI:   BP low in the office today, instructed to discuss with PCP stopping lisinopril and monitor BP at home.    Initial weight: 259 lbs 12/2024  Current weight: 226 lbs  Interval weight loss: 32 lbs  Total weight loss: 33 lbs    Body mass index is 37.15 kg/m².   Wt Readings from Last 6 Encounters:   07/15/25 226 lb (102.5 kg)   12/17/24 259 lb (117.5 kg)   09/24/24 254 lb (115.2 kg)     BP 99/61   Pulse 73   Ht 5' 5.4\" (1.661 m)   Wt 226 lb (102.5 kg)   BMI 37.15 kg/m²   Vitals:    07/15/25 1100   BP: 99/61   Pulse: 73     Body mass index is 37.15 kg/m².        Soda Drinker?: No      LABS and RESULTS:     [facility] Lab Encounter on 09/24/2024   Component Date Value    WBC 09/24/2024 7.1     RBC 09/24/2024 4.53     HGB 09/24/2024 13.3     HCT 09/24/2024 40.8     MCV 09/24/2024 90.1     MCH 09/24/2024 29.4     MCHC 09/24/2024 32.6     RDW-SD 09/24/2024 44.0     RDW 09/24/2024 13.5     PLT 09/24/2024 242.0     Neutrophil Absolute Prel* 09/24/2024 4.88     Neutrophil Absolute 09/24/2024 4.88     Lymphocyte Absolute 09/24/2024 1.45     Monocyte Absolute 09/24/2024 0.52     Eosinophil Absolute 09/24/2024 0.12     Basophil Absolute 09/24/2024 0.08     Immature Granulocyte Abs* 09/24/2024 0.01     Neutrophil % 09/24/2024 69.2     Lymphocyte % 09/24/2024 20.5     Monocyte % 09/24/2024 7.4     Eosinophil % 09/24/2024 1.7     Basophil % 09/24/2024 1.1     Immature Granulocyte % 09/24/2024 0.1     Glucose 09/24/2024 281 (H)     Sodium 09/24/2024 138     Potassium 09/24/2024 4.4     Chloride 09/24/2024 104     CO2 09/24/2024 28.0     Anion Gap 09/24/2024 6     BUN 09/24/2024 20     Creatinine 09/24/2024 0.97     BUN/CREA Ratio 09/24/2024 20.6 (H)     Calcium,  Total 09/24/2024 9.7     Calculated Osmolality 09/24/2024 299 (H)     eGFR-Cr 09/24/2024 64     ALT 09/24/2024 20     AST 09/24/2024 24     Alkaline Phosphatase 09/24/2024 99     Bilirubin, Total 09/24/2024 0.5     Total Protein 09/24/2024 7.2     Albumin 09/24/2024 4.2     Globulin  09/24/2024 3.0     A/G Ratio 09/24/2024 1.4     Patient Fasting for CMP? 09/24/2024 No     Cholesterol, Total 09/24/2024 249 (H)     HDL Cholesterol 09/24/2024 104 (H)     Triglycerides 09/24/2024 52     LDL Cholesterol 09/24/2024 137 (H)     VLDL 09/24/2024 9     Non HDL Chol 09/24/2024 145 (H)     Patient Fasting for Lipi* 09/24/2024 No     Microalbumin, Urine 09/24/2024 4.60     Creatinine Ur Random 09/24/2024 81.50     Malb/Cre Calc 09/24/2024 56.4 (H)    Office Visit on 09/24/2024   Component Date Value    HgbA1C 09/19/2024 7.8        Current Outpatient Medications   Medication Sig Dispense Refill    aspirin 81 MG Oral Tab EC Take 1 tablet (81 mg total) by mouth daily.      OZEMPIC, 1 MG/DOSE, 4 MG/3ML Subcutaneous Solution Pen-injector START WITH 0.25 MG ONCE WEEKLY 3 mL 0    NOVOLOG 100 UNIT/ML Injection Solution ADMINISTER 70 UNITS UNDER THE SKIN DAILY. INSULIN PUMP      buPROPion  MG Oral Tablet 24 Hr Take 1 tablet (300 mg total) by mouth every morning.      Cholecalciferol ( VITAMIN D3) 25 MCG (1000 UT) Oral Tab Take by mouth As Directed.      DULoxetine 30 MG Oral Cap DR Particles Take 1 capsule (30 mg total) by mouth daily.      DULoxetine 60 MG Oral Cap DR Particles Take 1 capsule (60 mg total) by mouth daily.      Glucose Blood (CONTOUR NEXT TEST) In Vitro Strip To Calibrate Dexcom G6 once daily and also incase of Dexcom Failure. Pt on insulin pump and type 1 diabetes ICD 10 Code E10.65      lisinopril 10 MG Oral Tab Take 2 tablets (20 mg total) by mouth daily.      levothyroxine 125 MCG Oral Tab Take 1 tablet (125 mcg total) by mouth daily.      INSULIN ADMIN SUPPLIES None Entered        Past Medical History:     Breast cancer (HCC)    Partial mastectomy    Essential hypertension    Hypothyroidism    Type 1 diabetes mellitus (HCC)       Past Surgical History:   Procedure Laterality Date    Chemotherapy      Lumpectomy right      Mastectomy partial Right 2008    Due to breast cancer    Radiation right         Social History:  Social History     Socioeconomic History    Marital status:    Tobacco Use    Smoking status: Never    Smokeless tobacco: Never   Vaping Use    Vaping status: Never Used   Substance and Sexual Activity    Alcohol use: No    Drug use: Never     Social Drivers of Health     Food Insecurity: No Food Insecurity (3/6/2024)    Received from [facility]    Hunger Vital Sign     Worried About Running Out of Food in the Last Year: Never true     Ran Out of Food in the Last Year: Never true   Transportation Needs: No Transportation Needs (3/6/2024)    Received from [facility]    PRAPARE - Transportation     Lack of Transportation (Medical): No     Lack of Transportation (Non-Medical): No   Housing Stability: Unknown (3/6/2024)    Received from [facility]    Housing Stability Vital Sign     Unable to Pay for Housing in the Last Year: No     Unstable Housing in the Last Year: No     Family History:  Family History   Problem Relation Age of Onset    Breast Cancer Self         55  Partial mastectomy    Cancer Mother         colon cancer    Diabetes Brother           REVIEW OF SYSTEMS:   10 point review of systems otherwise negative.  With the exception of HPI and assessment and plan        EXAM:     GENERAL: NAD, conversant  SKIN: good turgor, no jaundice  HEENT: nl external nose and ears  NECK: supple  LUNGS: nl effort, clear to auscultation bilaterally  CARDIO: RRR, no murmur  ABDOMEN: NT/ND, no masses  EXTREMITIES: gait normal, no edema   PSYCH: Oriented times three, appropriate affect       ASSESSMENT AND PLAN:     1. Type 1 diabetes mellitus  with hyperglycemia (HCC)  2. Hypothyroidism, unspecified type  3. Chronic depression  4. Mixed hyperlipidemia  5. HX: breast cancer  6. Binge eating  7. Obesity, Class III, BMI 40-49.9 (morbid obesity) (HCC)     - Initial weight 259 lb (117.5 kg), Initial Body mass index is 42.57 kg/m².  - obesity as childhood, went to a doctor, she started eating schedule and was able to lose weight and kept it off  -maintained 130 lbs until pregnancy  -gained 20 lbs with pregnancy  -WW occasionally lost about 10 lbs  -recently gained over 30 lbs since 8/2023  -type 1 DM, average A1c  8.0 -> 7.7    patient is interested in GLP1 medications, patient denies any personal or family history of MTC or in patients with Multiple Endocrine Neoplasia syndrome type 2 (MEN 2). Counseled patient regarding the potential risk for MTC with the use of semaglutide and inform them of symptoms of thyroid tumors (eg, a mass in the neck, dysphagia, dyspnea, persistent hoarseness).     Plan:  - using ozempic (counting clicks), 1 mg, doing well no hypoglycemia, communicating with her endocrinologist, on board  - stop lisinopril, PCP to be contacted by patient due to low BP with some dizziness    Regarding Obesity:  Follow up with dietitian and psychologist as recommended.  Discussed the role of sleep and stress in weight management.  Labs orders as above.  Counseled on comprehensive weight loss plan including attention to nutrition, exercise and behavior/stress management for success. See patient instruction below for more details.  Weight Loss Consent to treat reviewed and signed.    Regarding weight loss Medications.  Medication use and side effects reviewed with patient.    Medication will be used with a reduced calorie diet and increased physical activity in the management of exogenous obesity.  Patient will be responsible to let me know of any side effects or complications with medications.    Return in about 3 months (around 10/15/2025).     Jaimee  MD [place]